# Patient Record
Sex: MALE | Race: WHITE | NOT HISPANIC OR LATINO | URBAN - METROPOLITAN AREA
[De-identification: names, ages, dates, MRNs, and addresses within clinical notes are randomized per-mention and may not be internally consistent; named-entity substitution may affect disease eponyms.]

---

## 2023-08-03 DIAGNOSIS — J45.20 MILD INTERMITTENT ASTHMA, UNSPECIFIED WHETHER COMPLICATED (HHS-HCC): Primary | ICD-10-CM

## 2023-08-03 RX ORDER — ALBUTEROL SULFATE 90 UG/1
AEROSOL, METERED RESPIRATORY (INHALATION)
COMMUNITY
Start: 2020-04-20 | End: 2023-08-03 | Stop reason: SDUPTHER

## 2023-08-03 RX ORDER — ALBUTEROL SULFATE 90 UG/1
2 AEROSOL, METERED RESPIRATORY (INHALATION) DAILY
Qty: 18 G | Refills: 0 | Status: SHIPPED | OUTPATIENT
Start: 2023-08-03

## 2024-08-15 ENCOUNTER — OFFICE VISIT (OUTPATIENT)
Dept: PRIMARY CARE | Facility: CLINIC | Age: 44
End: 2024-08-15
Payer: COMMERCIAL

## 2024-08-15 VITALS
BODY MASS INDEX: 27.35 KG/M2 | DIASTOLIC BLOOD PRESSURE: 77 MMHG | OXYGEN SATURATION: 97 % | SYSTOLIC BLOOD PRESSURE: 118 MMHG | WEIGHT: 191 LBS | HEART RATE: 83 BPM | HEIGHT: 70 IN

## 2024-08-15 DIAGNOSIS — F10.10 ETOH ABUSE: ICD-10-CM

## 2024-08-15 DIAGNOSIS — Z00.00 HEALTH CARE MAINTENANCE: ICD-10-CM

## 2024-08-15 DIAGNOSIS — J45.21 MILD INTERMITTENT ASTHMA WITH ACUTE EXACERBATION (HHS-HCC): Primary | ICD-10-CM

## 2024-08-15 DIAGNOSIS — M54.9 BACK PAIN, UNSPECIFIED BACK LOCATION, UNSPECIFIED BACK PAIN LATERALITY, UNSPECIFIED CHRONICITY: ICD-10-CM

## 2024-08-15 PROBLEM — J45.909 ASTHMA (HHS-HCC): Status: ACTIVE | Noted: 2024-08-15

## 2024-08-15 PROBLEM — F41.1 GENERALIZED ANXIETY DISORDER: Status: ACTIVE | Noted: 2024-08-15

## 2024-08-15 PROBLEM — L23.0 ALLERGIC CONTACT DERMATITIS DUE TO METALS: Status: ACTIVE | Noted: 2017-02-06

## 2024-08-15 PROCEDURE — 3008F BODY MASS INDEX DOCD: CPT | Performed by: INTERNAL MEDICINE

## 2024-08-15 PROCEDURE — 99396 PREV VISIT EST AGE 40-64: CPT | Performed by: INTERNAL MEDICINE

## 2024-08-15 PROCEDURE — 1036F TOBACCO NON-USER: CPT | Performed by: INTERNAL MEDICINE

## 2024-08-15 RX ORDER — LANOLIN ALCOHOL/MO/W.PET/CERES
1 CREAM (GRAM) TOPICAL
COMMUNITY
Start: 2024-07-29

## 2024-08-15 RX ORDER — TIZANIDINE 2 MG/1
2 TABLET ORAL EVERY 6 HOURS PRN
Qty: 30 TABLET | Refills: 0 | Status: SHIPPED | OUTPATIENT
Start: 2024-08-15 | End: 2024-08-25

## 2024-08-15 RX ORDER — FLUTICASONE PROPIONATE AND SALMETEROL 250; 50 UG/1; UG/1
1 POWDER RESPIRATORY (INHALATION) 2 TIMES DAILY
COMMUNITY
Start: 2022-09-22

## 2024-08-15 RX ORDER — FLUTICASONE PROPIONATE 50 MCG
1 SPRAY, SUSPENSION (ML) NASAL EVERY 12 HOURS
COMMUNITY
Start: 2018-04-17

## 2024-08-15 RX ORDER — LEVALBUTEROL TARTRATE 45 UG/1
1 AEROSOL, METERED ORAL EVERY 4 HOURS PRN
COMMUNITY
Start: 2024-07-29

## 2024-08-15 RX ORDER — MELOXICAM 15 MG/1
1 TABLET ORAL DAILY
COMMUNITY
Start: 2019-07-01

## 2024-08-15 RX ORDER — LORATADINE 10 MG/1
1 TABLET ORAL DAILY
COMMUNITY
Start: 2015-12-01

## 2024-08-15 RX ORDER — GABAPENTIN 600 MG/1
1 TABLET ORAL
COMMUNITY
Start: 2024-07-29

## 2024-08-15 NOTE — PROGRESS NOTES
"Subjective   Patient ID: Mason Potts is a 43 y.o. male who presents for Annual Exam.          HPI     Patient is a 43-year-old male with past medical history of asthma who presents for wellness.  Patient has not been seen in over 2 years.  He is lost a significant amount of weight.  Of note he was admitted to rehab for detox from alcohol.  He has been hiding his alcoholism for a number of years.  He is now abstinent ongoing x 3 weeks.  He is currently in IOP.  Overall his wife is very supportive and so is his family.  Patient hit rock bottom.  He is currently on gabapentin for treatment of his cravings.  He needs a referral for psychiatry and psychologist.    No other complaints at this time.  His blood pressure is well-controlled off medications.    Denies smoking.  Smokes marijuana occasionally.  No significant exercise.        Review of Systems  Constitutional: No fever or chills, No Night Sweats  Eyes: No Blurry Vision or Eye sight problems  ENT: No Nasal Discharge, Hoarseness, sore throat  Cardiovascular: no chest pain, no palpitations and no syncope.   Respiratory: no cough, no shortness of breath during exertion and no shortness of breath at rest.   Gastrointestinal: no abdominal pain, no nausea and no vomiting.   : No issues with urinary stream, burning with urination, no blood in urine or stools  Skin: No Skin rashes or Lesions  Neuro: No Headache, no dizziness or Numbness or tingling  Psych: No Anxiety, depression or sleeping problems  Heme: No Easy bleeding or brusing.     Objective   /77   Pulse 83   Ht 1.778 m (5' 10\")   Wt 86.6 kg (191 lb)   SpO2 97%   BMI 27.41 kg/m²     Physical Exam  Constitutional: Alert and in no acute distress. Well developed, well nourished.   Head and Face: Head and face: Normal.    Eyes: Normal external exam. Pupils were equal in size, round, reactive to light (PERRL) with normal accommodation and extraocular movements intact (EOMI).   Ears, Nose, Mouth, " "and Throat: External inspection of ears and nose: Normal.  Hearing: Normal.  Nasal mucosa, septum, and turbinates: Normal.  Lips, teeth, and gums: Normal.  Oropharynx: Normal.   Neck: No neck mass was observed. Supple. Thyroid not enlarged and there were no palpable thyroid nodules.   Cardiovascular: Heart rate and rhythm were normal, normal S1 and S2. Pedal pulses: Normal. No peripheral edema.   Pulmonary: No respiratory distress. Clear bilateral breath sounds.   Abdomen: Soft nontender; no abdominal mass palpated. Normal bowel sounds. No organomegaly.   : Deferred  Musculoskeletal: No joint swelling seen, normal movements of all extremities. Range of motion: Normal.  Muscle strength/tone: Normal.    Skin: Normal skin color and pigmentation, normal skin turgor, and no rash.   Neurologic: Deep tendon reflexes were 2+ and symmetric.   Psychiatric: Judgment and insight: Intact. Mood and affect: Normal.  Lymphatic: No cervical lymphadenopathy. Palpation of lymph nodes in axillae: Normal.  Palpation of lymph nodes in groin: Normal.    No results found for: \"WBC\", \"HGB\", \"HCT\", \"PLT\", \"CHOL\", \"TRIG\", \"HDL\", \"LDLDIRECT\", \"ALT\", \"AST\", \"NA\", \"K\", \"CL\", \"CREATININE\", \"BUN\", \"CO2\", \"TSH\", \"PSA\", \"INR\", \"GLUF\", \"HGBA1C\", \"ALBUR\"    MR LUMBAR SPINE WO IV CONTRAST  MRN: [id number]  Patient Name: TAHIR BHARDWAJ     STUDY:  MRI L-SPINE WO; 8/29/2019 5:30 pm     INDICATION:  left lower extremity numbness and tingling.     COMPARISON:  Lumbar spine x-rays, 06/18/2019     ACCESSION NUMBER(S):  [id number]     ORDERING CLINICIAN:  EDER MÁRQUEZ     TECHNIQUE:  Sagittal T1, T2, STIR, axial T1 and T2 weighted images of the lumbar  spine were acquired.     FINDINGS:  Alignment: The vertebral alignment is maintained.     Vertebrae/Intervertebral Discs: The vertebral bodies demonstrate  expected height.The marrow signal is within normal limits. The  intervertebral discs also demonstrate normal signal and morphology.     Conus: The lower " thoracic cord appears unremarkable. The conus  terminates at L1.     T12-L1:  There is no significant central canal or neural foraminal  stenosis.     L1-2:  There is no significant central canal or neural foraminal  stenosis.     L2-3: Small disc bulge and mild facet hypertrophy. There is no  significant central canal or neural foraminal stenosis.     L3-4: Disc bulge and facet hypertrophy with mild narrowing of both  subarticular recesses as well as mild right neural foramen narrowing.     L4-5: Asymmetric disc bulge, facet hypertrophy, and ligamentum flavum  thickening resulting in mild narrowing of the left subarticular  recess and neural foramen as well as moderate narrowing of the right  subarticular recess and neural foramen. No central spinal canal  narrowing.     L5-S1: Disc bulge with a superimposed inferiorly directed left  central disc extrusion. This severely narrows the left subarticular  recess and impinges on the descending left S1 and possibly S2 nerve  roots. There is also bilateral facet hypertrophy with moderate left  neural foramen narrowing.     The prevertebral and posterior paraspinous soft tissues are  unremarkable.     IMPRESSION:  Multilevel degenerative changes most pronounced at L5-S1 where an  extruded disc impinges on the left S1 nerve root.      Assessment/Plan   Problem List Items Addressed This Visit             ICD-10-CM    Mild intermittent asthma with acute exacerbation ([facility]) - Primary J45.21     Other Visit Diagnoses         Codes    Health care maintenance     Z00.00    Relevant Orders    CBC    Comprehensive metabolic panel    Lipid Panel    Back pain, unspecified back location, unspecified back pain laterality, unspecified chronicity     M54.9    Relevant Medications    tiZANidine (Zanaflex) 2 mg tablet    ETOH abuse     F10.10    Relevant Orders    Referral to Psychology    Referral to Psychiatry          Continue with IOP.  Referral to psychiatry and psychologist for  continued treatment following IOP    Dear Mason Potts     It was my pleasure to take care of you today in the office. Below are the things we discussed today:    1. Immunizations: Yearly Flu shot is recommended.          a: COVID: Up-to-date         b: Tetanus: Up-to-date      2. Blood Work: Ordered  3. Seen your dentist twice a year  4. Yearly Eye exam is recommended    5. BMI: 27.5  6: Diet recommendations:   Eat Clean, Try to have as many home cooked meals as possible  Avoid processed foods which contain excess calories, sugar, and sodium.    7. Exercise recommendations:   150 minutes a week to maintain your weight     If you have to loose weight, you need a better diet and exercise plan.     8. Please get your Living will / Advance directive completed if you do not have one already. Please make sure our office has a copy of the latest one.     9. Colonoscopy: Start at age 45  10. PSA: Start at age 45    11.  Follow-up annually or as needed    Follow up in one year for a Physical. Please call the office before your Physical to see if you need blood work completed prior to your physical.     Please call me if any questions arise from now until your next visit. I will call you after I am done seeing patients. A Doctor is always available by phone when the office is closed. Please feel free to call for help with any problem that you feel shouldn't wait until the office re-opens.     Elvin Roberts, DO

## 2024-09-03 ENCOUNTER — APPOINTMENT (OUTPATIENT)
Dept: PRIMARY CARE | Facility: CLINIC | Age: 44
End: 2024-09-03

## 2024-09-24 ENCOUNTER — APPOINTMENT (OUTPATIENT)
Dept: BEHAVIORAL HEALTH | Facility: CLINIC | Age: 44
End: 2024-09-24
Payer: COMMERCIAL

## 2024-10-02 ENCOUNTER — APPOINTMENT (OUTPATIENT)
Dept: BEHAVIORAL HEALTH | Facility: CLINIC | Age: 44
End: 2024-10-02
Payer: COMMERCIAL

## 2024-10-02 DIAGNOSIS — F41.1 GENERALIZED ANXIETY DISORDER: ICD-10-CM

## 2024-10-02 DIAGNOSIS — F33.1 MAJOR DEPRESSIVE DISORDER, RECURRENT EPISODE, MODERATE: Primary | ICD-10-CM

## 2024-10-02 DIAGNOSIS — F10.10 ETOH ABUSE: ICD-10-CM

## 2024-10-02 PROCEDURE — 90791 PSYCH DIAGNOSTIC EVALUATION: CPT | Performed by: PSYCHIATRY & NEUROLOGY

## 2024-10-02 NOTE — PROGRESS NOTES
Time: 3:00 PM to 3:47 PM  Address: [address], [place], [place]  Email: [email or web address]  Ph. #: [phone]  : 1980; 44 years old.  SSN: [id number]    Patient is a 44-year-old, ,  male who presents for depression and anxiety.  Patient reported that he had an alcohol crash at the end of July and went to [facility] for detox for 5 days and then attended IOP there; patient wants to deepen his understanding of his drinking.    Patient reported that he was born in [facility], and because his dad worked in manufacturing they moved around a lot and he ended up graduating high school in [place]; patient reported he moved there in the sixth grade; patient reported that his parents were very direct and open and were great parents; patient reported moving around was tough; patient reported there were positives and negatives as he was able to make friends easily but had to leave them behind; patient denied any developmental issues.    Patient denied any medical or mental health issues from his mother side of the family, him 74, and they have a great relationship; patient reported a history of heart issues and hypertension from his father side of the family Venu 74 and they have a great relationship; patient reported he has 1 sibling Aquiles 39 years old who lives in [place] and they have a great relationship.    Patient reported that he lives with his wife Sue, 43 years old, and they have been  for 13 years and together for 16 years; patient reported his marriage is great; patient reported that he has 2 daughters Divya 6 and even 3.    Patient reported that his Restorationism/spiritual orientation is Anglican and his bo is important to them; patient reported his primary emotional support is himself and his wife; patient reported his best friend is Bobby; patient reported that he has 3-4 or 5-hour energy drinks a day and 2 cups of coffee; patient reported that he was  drinking 3-1/2 L of vodka a week and quit July 24, 2024 patient reported he smokes 5 cigarettes a day, and uses a marijuana vape pen daily.    Patient reported that he enjoys playing [employer] and working out; patient reported his strengths are math and engineering, he is a people person, he is personable, a good dad, a good , and a provider; patient reported the family has 1 dog; patient reported he is a high school graduate and has a bachelor's degree in applied statistics; patient reported he currently works as the  at [email or web address] for 5-1/2 years and his job is stressful; patient denies any psychiatric hospitalizations or abuse; patient reported his most traumatic experience was going through detox.    Symptoms:  1. Depressed/empty for most of the day; the first time was in elementary school and the last time was recently.  2. Decreased Interest in pleasure      4. Trouble falling or staying asleep   5. Psychomotor retardation/agitation (feel like you're moving slow)      7. Excessive or inappropriate guilt      8. indecisiveness      9. Preoccupied with death & dying      Crying spells      Worries about: Patient reported caring, providing for his family, his parents, and work.  Excessive anxiety   Hard to control   1. Restlessness   2. Easily fatigued   3. Difficulty concentrating or mind goes blank   4. Irritability   5. Muscle Tension   6. Problems falling asleep or staying asleep   Racing thoughts      Phobias or Obsessions    (Checking, counting, changing clothes, touching objects, objects have to be placed just right, hording, picking, hair pulling, obsessed with orderliness, numbers, dirt & germs, self-doubt, foods)   Compulsions        (Addictions: sex, drugs, overeating, alcohol, shopping, gambling, etc.)      History of Mood Swings (Highs & Lows)        MSE  General appearance & grooming: Appropriate              Behavior: Cooperative               Adaptive Equipment: na  Speech:  Appropriate         Mood: Euthymic     Depressed/Flat     Anxious     Angry     Euphoric     Irritable     Elated  Affect: Appropriate                  Thought process:      Logical       Thought content: Appropriate       Cognition: No impairment, Orientation: Alert & Oriented x 3  Insight:  Aware of problem       Eye contact: Average       Judgment: Judgment intact       Interview behavior: Appropriate       Hallucinations: None       Delusions: Absent         Conclusion  Reason for Care: Based on the information provided by the patient, he meets the DSM-5-TR, ICD-11 diagnoses of F33.1, major depressive disorder, recurrent, moderate, and F41.1, Generalized Anxiety Disorder.  Method of Therapy: Diagnostic Assessment  Therapy Summary: Pt. was open and cooperative in providing the necessary information to complete their assessment.  Identified Goals and Objectives: To deepen his understanding of his drinking alcohol, reduce depression and anxiety, increase healthier coping skills.  Response to Therapy: Pt. was engaged.  Treatment Plan and Process: Continue with above stated goals; follow up 2 weeks.

## 2024-10-16 ENCOUNTER — APPOINTMENT (OUTPATIENT)
Dept: BEHAVIORAL HEALTH | Facility: CLINIC | Age: 44
End: 2024-10-16
Payer: COMMERCIAL

## 2024-10-21 ENCOUNTER — APPOINTMENT (OUTPATIENT)
Dept: BEHAVIORAL HEALTH | Facility: CLINIC | Age: 44
End: 2024-10-21
Payer: COMMERCIAL

## 2024-10-31 ENCOUNTER — APPOINTMENT (OUTPATIENT)
Dept: BEHAVIORAL HEALTH | Facility: CLINIC | Age: 44
End: 2024-10-31
Payer: COMMERCIAL

## 2024-11-07 ENCOUNTER — APPOINTMENT (OUTPATIENT)
Dept: BEHAVIORAL HEALTH | Facility: CLINIC | Age: 44
End: 2024-11-07
Payer: COMMERCIAL

## 2024-11-14 ENCOUNTER — APPOINTMENT (OUTPATIENT)
Dept: BEHAVIORAL HEALTH | Facility: CLINIC | Age: 44
End: 2024-11-14
Payer: COMMERCIAL

## 2025-09-04 ENCOUNTER — OFFICE VISIT (OUTPATIENT)
Dept: PRIMARY CARE | Facility: CLINIC | Age: 45
End: 2025-09-04
Payer: COMMERCIAL

## 2025-09-04 VITALS
WEIGHT: 207 LBS | HEART RATE: 98 BPM | DIASTOLIC BLOOD PRESSURE: 80 MMHG | BODY MASS INDEX: 29.7 KG/M2 | OXYGEN SATURATION: 98 % | SYSTOLIC BLOOD PRESSURE: 111 MMHG

## 2025-09-04 DIAGNOSIS — F10.90 ALCOHOL USE DISORDER: Primary | ICD-10-CM

## 2025-09-04 PROCEDURE — 1036F TOBACCO NON-USER: CPT | Performed by: INTERNAL MEDICINE

## 2025-09-04 PROCEDURE — 99213 OFFICE O/P EST LOW 20 MIN: CPT | Performed by: INTERNAL MEDICINE
